# Patient Record
Sex: FEMALE | Race: BLACK OR AFRICAN AMERICAN | NOT HISPANIC OR LATINO | URBAN - METROPOLITAN AREA
[De-identification: names, ages, dates, MRNs, and addresses within clinical notes are randomized per-mention and may not be internally consistent; named-entity substitution may affect disease eponyms.]

---

## 2017-06-14 ENCOUNTER — EMERGENCY (EMERGENCY)
Facility: HOSPITAL | Age: 27
LOS: 1 days | Discharge: HOME | End: 2017-06-14
Attending: EMERGENCY MEDICINE
Payer: COMMERCIAL

## 2017-06-14 VITALS
HEIGHT: 64 IN | SYSTOLIC BLOOD PRESSURE: 127 MMHG | DIASTOLIC BLOOD PRESSURE: 88 MMHG | HEART RATE: 82 BPM | WEIGHT: 154.98 LBS | TEMPERATURE: 99 F | OXYGEN SATURATION: 100 % | RESPIRATION RATE: 18 BRPM

## 2017-06-14 DIAGNOSIS — Z98.89 OTHER SPECIFIED POSTPROCEDURAL STATES: Chronic | ICD-10-CM

## 2017-06-14 DIAGNOSIS — M54.5 LOW BACK PAIN: ICD-10-CM
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
## 2017-06-14 DIAGNOSIS — S16.1XXA STRAIN OF MUSCLE, FASCIA AND TENDON AT NECK LEVEL, INITIAL ENCOUNTER: ICD-10-CM

## 2017-06-14 DIAGNOSIS — M54.2 CERVICALGIA: ICD-10-CM
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
## 2017-06-14 DIAGNOSIS — Z79.1 LONG TERM (CURRENT) USE OF NON-STEROIDAL ANTI-INFLAMMATORIES (NSAID): ICD-10-CM

## 2017-06-14 DIAGNOSIS — S63.612A UNSPECIFIED SPRAIN OF RIGHT MIDDLE FINGER, INITIAL ENCOUNTER: ICD-10-CM

## 2017-06-14 PROCEDURE — 99283 EMERGENCY DEPT VISIT LOW MDM: CPT

## 2017-06-14 RX ORDER — IBUPROFEN 200 MG
600 TABLET ORAL ONCE
Qty: 0 | Refills: 0 | Status: COMPLETED | OUTPATIENT
Start: 2017-06-14 | End: 2017-06-14

## 2017-06-14 RX ORDER — CYCLOBENZAPRINE HYDROCHLORIDE 10 MG/1
1 TABLET, FILM COATED ORAL
Qty: 15 | Refills: 0 | OUTPATIENT
Start: 2017-06-14

## 2017-06-14 RX ORDER — CYCLOBENZAPRINE HYDROCHLORIDE 10 MG/1
10 TABLET, FILM COATED ORAL ONCE
Qty: 0 | Refills: 0 | Status: COMPLETED | OUTPATIENT
Start: 2017-06-14 | End: 2017-06-14

## 2017-06-14 RX ADMIN — Medication 600 MILLIGRAM(S): at 16:35

## 2017-06-14 RX ADMIN — Medication 600 MILLIGRAM(S): at 16:44

## 2017-06-14 RX ADMIN — CYCLOBENZAPRINE HYDROCHLORIDE 10 MILLIGRAM(S): 10 TABLET, FILM COATED ORAL at 16:35
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
## 2017-06-14 NOTE — ED PROVIDER NOTE - SECONDARY DIAGNOSIS.
Strain of neck muscle, initial encounter Low back pain, unspecified back pain laterality, unspecified chronicity, with sciatica presence unspecified

## 2017-06-14 NOTE — ED PROVIDER NOTE - MEDICAL DECISION MAKING DETAILS
pt appears well and non-toxic. . VSS. Sx have improved during ED stay. No acute events during ED observation period. Precautions given to return to the ED if sx persist or change. Pt expresses understanding and has no further questions. Pt feels comfortable and wishes to be discharged home. Instructed to follow up with PMD in 24 hrs. pt appears well and non-toxic. .placed splint onf R 3rd finger. VSS. Sx have improved during ED stay. No acute events during ED observation period. Precautions given to return to the ED if sx persist or change. Pt expresses understanding and has no further questions. Pt feels comfortable and wishes to be discharged home. Instructed to follow up with PMD in 24 hrs.

## 2017-06-14 NOTE — ED PROVIDER NOTE - PHYSICAL EXAMINATION
GEN: Alert, NAD  HEAD: atraumatic, EOMI, PERRL, normal lids/conjunctiva  ENT: normal hearing, patent oropharynx without erythema/exudate, uvula midline  NECK: +supple, no tenderness/meningismus, +Trachea midline  PULM: Bilateral BS, normal resp effort, no wheeze/stridor/retractions  CV: RRR, no M/R/G, +dist ext pulses  ABD: soft, NT/ND  BACK: no CVAT, no midline tenderness, diffuse neck lower back pain, no midline tenderness  MSK: no edema/erythema/cyanosis  SKIN: no rash  NEURO: AAOx3, no sensory/motor deficits, CN 2-12 intact GEN: Alert, NAD  HEAD: atraumatic, EOMI, PERRL, normal lids/conjunctiva  ENT: normal hearing, patent oropharynx without erythema/exudate, uvula midline  NECK: +supple, no tenderness/meningismus, +Trachea midline  PULM: Bilateral BS, normal resp effort, no wheeze/stridor/retractions  CV: RRR, no M/R/G, +dist ext pulses  ABD: soft, NT/ND  BACK: no CVAT, no midline tenderness, diffuse neck lower back pain, no midline tenderness  MSK: no edema/erythema/cyanosis, mild R 3rd finger tenderness w ROM, no marked swelling, minmal ttp over pip jt, on gross deformity  SKIN: no rash  NEURO: AAOx3, no sensory/motor deficits, CN 2-12 intact
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
## 2019-02-05 NOTE — ED ADULT TRIAGE NOTE - PRO INTERPRETER NEED 2
Attending Attestation





- Resident


Resident Name: Henry Williamson





- ED Attending Attestation


I have performed the following: I have examined & evaluated the patient, The 

case was reviewed & discussed with the resident, I agree w/resident's findings 

& plan, Exceptions are as noted





- HPI


HPI: 





02/05/19 06:34


51 y/o F w  IDDM, hypothyroidism, HTN, HLD, c/o 2 days cough sore throat. cough 

productive yellow phlegm no tobacco use no h/o asthma. no sick contacts.  no n/

v no abd pain. subjective fevers at home.  no diarreha or vomiting. tolerating 

po well. 





- Physicial Exam


PE: 





02/05/19 06:36


awake alert throat no exudates. no tonsillar erythema. lungs clear bilaterally 

heart rrr no mrg abd soft nt nd. ext wwp . skin warm and dry. nueor alert 

oriented x 3





- Medical Decision Making





02/05/19 06:37


differential viral uri, pharyngitis. bronchitis pna. plan cxr tylenol reassess.





cxr with concern for retrocardiac opacification will tx with zpak for 

bronchitis. dc home.
History of Present Illness





- General


Chief Complaint: Sore Throat


Stated Complaint: SORE THROAT,BODYACHES


Time Seen by Provider: 02/05/19 04:21


History Source: Patient


Exam Limitations: Language Barrier (can converse effectively in English but non-

fluent)





- History of Present Illness


Initial Comments: 


Patient is a 51 y/o F w/ PMHx IDDM, hypothyroidism, HTN, HLD, p/w 1 day sore 

throat, non-productive cough, fever measured at home to 100.3, generalized 

malaise, body aches, weakness. Took Motrin and Aleve at home. Afebrile with 

stable vitals on presentation. No other complaints. Has not had flu vaccine.


02/05/19 04:53











Past History





- Travel


Traveled outside of the country in the last 30 days: No


Close contact w/someone who was outside of country & ill: No





- Past Medical History


Allergies/Adverse Reactions: 


 Allergies











Allergy/AdvReac Type Severity Reaction Status Date / Time


 


codeine phosphate Allergy Intermediate Rash Verified 02/05/19 04:14





[From Tylenol-Codeine #3]     











Home Medications: 


Ambulatory Orders





Amlodipine Besylate [Norvasc -] 10 mg PO DAILY 12/30/14 


Fluticasone Propionate [Flovent Diskus] 50 mcg IH BID PRN 01/16/15 


Insulin Aspart [Novolog] 15 unit SQ TID 04/11/17 


Levothyroxine [Synthroid -] 125 mcg PO DAILY 04/11/17 


Aspirin [Ecotrin] 81 mg PO DAILY 03/29/18 


Atorvastatin Ca [Lipitor] 10 mg PO HS 03/29/18 


Cholecalciferol (Vitamin D3) [Vitamin D3] 5,000 unit PO DAILY 03/29/18 


Ibuprofen 600 mg PO ASDIR PRN 03/29/18 


Insulin Detemir [Levemir Flextouch] 45 unit SQ BID 03/29/18 


Losartan Potassium 100 mg PO DAILY 03/29/18 


oxyCODONE HCL [Roxicodone -] 5 mg PO Q4H PRN #20 tablet MDD 6 04/02/18 


Cyclobenzaprine HCl [Flexeril -] 10 mg PO BID #10 tablet 10/28/18 


Azithromycin 250 mg PO DAILY #6 tablet 02/05/19 








Anemia: No


Asthma: No


Cancer: No


Cardiac Disorders: Yes ("mild heart attack"2011-cardiac cath X2 NO FINDING,NO 

STENTS)


CVA: No


COPD: No


CHF: No


Dementia: No


Diabetes: Yes (IDDM X15 YEARS)


GI Disorders: No


 Disorders: No


HTN: Yes


Hypercholesterolemia: Yes


Liver Disease: No


Seizures: No


Thyroid Disease: Yes





- Surgical History


Abdominal Surgery: Yes ("TUMMY TUCK" 2010)


Appendectomy: No


Cardiac Surgery: No


Cholecystectomy: No


Lung Surgery: No


Neurologic Surgery: No


Orthopedic Surgery: Yes (RIGHT KNEE SX X3, scope, yang-knee)





- Suicide/Smoking/Psychosocial Hx


Smoking Status: No


Smoking History: Never smoked


Have you smoked in the past 12 months: No


Number of Cigarettes Smoked Daily: 0


Information on smoking cessation initiated: No


Hx Alcohol Use: No


Drug/Substance Use Hx: No


Substance Use Type: None


Hx Substance Use Treatment: No





**Review of Systems





- Review of Systems


Comments:: 


As per HPI


02/05/19 04:55








*Physical Exam





- Vital Signs


 Last Vital Signs











Temp Pulse Resp BP Pulse Ox


 


 97.9 F   83   16   136/72   97 


 


 02/05/19 02:56  02/05/19 02:56  02/05/19 02:56  02/05/19 02:56  02/05/19 02:56














- Physical Exam


Comments: 


Gen: A&Ox3, NAD


HEENT: NC/AT, PERRLA, EOMI, MMM, no oropharyngeal erythema or exudates


Neck: supple, TTP, no LAD


CV: RRR no m/r/g


Resp: CTA b/l


Abd: +bs, soft, NT, ND


Ext: 2+ pulses, wwp, no edema


Neuro: CNs, motor, sensory systems w/o focal deficit


Psych: normal mood, normal affect


Skin: warm, dry, normal turgor


02/05/19 04:55








Moderate Sedation





- Procedure Monitoring


Vital Signs: 


Procedure Monitoring Vital Signs











Temperature  97.9 F   02/05/19 02:56


 


Pulse Rate  83   02/05/19 02:56


 


Respiratory Rate  16   02/05/19 02:56


 


Blood Pressure  136/72   02/05/19 02:56


 


O2 Sat by Pulse Oximetry (%)  97   02/05/19 02:56











ED Treatment Course





- RADIOLOGY


Radiology Studies Ordered: 














 Category Date Time Status


 


 CHEST PA & LAT [RAD] Stat Radiology  02/05/19 04:49 Ordered














Medical Decision Making





- Medical Decision Making


Presentation is convincing for viral pharyngitis. Will provide tylenol, obtain 

flu swab, CXR for r/o PNA. No need for more elaborate w/u at this time.


02/05/19 04:56





Flu swab negative.


02/05/19 05:49





CXR minimally concerning for bronchial infection. Will prescribe outpatient Z-

pack and discharge.


02/05/19 06:37








*DC/Admit/Observation/Transfer


Diagnosis at time of Disposition: 


 Bronchitis








- Discharge Dispostion


Disposition: HOME


Condition at time of disposition: Stable





- Referrals


Referrals: 


Sanchez Cisneros MD [Primary Care Provider] - 





- Patient Instructions


Printed Discharge Instructions:  DI for Acute Bronchitis


Additional Instructions: 


You were evaluated for respiratory infection. Testing for flu and pneumonia was 

negative. You may take Motrin or Tylenol at home for symptomatic relief. We 

have prescribed a short course of antibiotics to your pharmacy. Please take 

exactly as directed to completion. If you experiencing worsening sore throat, 

shortness of breath, pain with breathing, fevers persisting despite Tylenol/

Motrin, chest pain, loss of consciousness, or any other new or concerning 

symptom, please return to the Emergency Department. Otherwise please follow up 

with your primary care doctor.





- Post Discharge Activity
English